# Patient Record
Sex: FEMALE | Race: WHITE | NOT HISPANIC OR LATINO | Employment: PART TIME | URBAN - NONMETROPOLITAN AREA
[De-identification: names, ages, dates, MRNs, and addresses within clinical notes are randomized per-mention and may not be internally consistent; named-entity substitution may affect disease eponyms.]

---

## 2020-11-06 ENCOUNTER — OFFICE VISIT (OUTPATIENT)
Dept: FAMILY MEDICINE CLINIC | Facility: CLINIC | Age: 55
End: 2020-11-06

## 2020-11-06 VITALS
TEMPERATURE: 98.2 F | BODY MASS INDEX: 26.84 KG/M2 | WEIGHT: 171 LBS | HEIGHT: 67 IN | OXYGEN SATURATION: 98 % | SYSTOLIC BLOOD PRESSURE: 108 MMHG | DIASTOLIC BLOOD PRESSURE: 73 MMHG | HEART RATE: 71 BPM | RESPIRATION RATE: 16 BRPM

## 2020-11-06 DIAGNOSIS — R53.83 FATIGUE, UNSPECIFIED TYPE: ICD-10-CM

## 2020-11-06 DIAGNOSIS — R52 PAIN: ICD-10-CM

## 2020-11-06 DIAGNOSIS — E53.8 VITAMIN B 12 DEFICIENCY: Primary | ICD-10-CM

## 2020-11-06 DIAGNOSIS — L65.9 HAIR LOSS: ICD-10-CM

## 2020-11-06 DIAGNOSIS — E55.9 VITAMIN D DEFICIENCY: ICD-10-CM

## 2020-11-06 DIAGNOSIS — E78.2 MIXED HYPERLIPIDEMIA: ICD-10-CM

## 2020-11-06 DIAGNOSIS — Z13.1 SCREENING FOR DIABETES MELLITUS: ICD-10-CM

## 2020-11-06 PROBLEM — M47.26 OSTEOARTHRITIS OF SPINE WITH RADICULOPATHY, LUMBAR REGION: Status: ACTIVE | Noted: 2020-11-06

## 2020-11-06 PROBLEM — Z85.3 HISTORY OF BREAST CANCER: Status: ACTIVE | Noted: 2020-11-06

## 2020-11-06 LAB — HBA1C MFR BLD: 5.9 % (ref 3.5–5.6)

## 2020-11-06 PROCEDURE — 86431 RHEUMATOID FACTOR QUANT: CPT | Performed by: NURSE PRACTITIONER

## 2020-11-06 PROCEDURE — 86140 C-REACTIVE PROTEIN: CPT | Performed by: NURSE PRACTITIONER

## 2020-11-06 PROCEDURE — 83036 HEMOGLOBIN GLYCOSYLATED A1C: CPT | Performed by: NURSE PRACTITIONER

## 2020-11-06 PROCEDURE — 86038 ANTINUCLEAR ANTIBODIES: CPT | Performed by: NURSE PRACTITIONER

## 2020-11-06 PROCEDURE — 99203 OFFICE O/P NEW LOW 30 MIN: CPT | Performed by: NURSE PRACTITIONER

## 2020-11-06 PROCEDURE — 36415 COLL VENOUS BLD VENIPUNCTURE: CPT | Performed by: NURSE PRACTITIONER

## 2020-11-06 PROCEDURE — 84439 ASSAY OF FREE THYROXINE: CPT | Performed by: NURSE PRACTITIONER

## 2020-11-06 PROCEDURE — 84550 ASSAY OF BLOOD/URIC ACID: CPT | Performed by: NURSE PRACTITIONER

## 2020-11-06 PROCEDURE — 82607 VITAMIN B-12: CPT | Performed by: NURSE PRACTITIONER

## 2020-11-06 PROCEDURE — 82306 VITAMIN D 25 HYDROXY: CPT | Performed by: NURSE PRACTITIONER

## 2020-11-06 PROCEDURE — 84481 FREE ASSAY (FT-3): CPT | Performed by: NURSE PRACTITIONER

## 2020-11-06 PROCEDURE — 84443 ASSAY THYROID STIM HORMONE: CPT | Performed by: NURSE PRACTITIONER

## 2020-11-06 PROCEDURE — 80061 LIPID PANEL: CPT | Performed by: NURSE PRACTITIONER

## 2020-11-06 PROCEDURE — 80053 COMPREHEN METABOLIC PANEL: CPT | Performed by: NURSE PRACTITIONER

## 2020-11-06 PROCEDURE — 85025 COMPLETE CBC W/AUTO DIFF WBC: CPT | Performed by: NURSE PRACTITIONER

## 2020-11-06 RX ORDER — DULOXETIN HYDROCHLORIDE 30 MG/1
30 CAPSULE, DELAYED RELEASE ORAL DAILY
Qty: 30 CAPSULE | Refills: 0 | Status: SHIPPED | OUTPATIENT
Start: 2020-11-06 | End: 2021-06-04 | Stop reason: SDUPTHER

## 2020-11-06 RX ORDER — DULOXETIN HYDROCHLORIDE 30 MG/1
30 CAPSULE, DELAYED RELEASE ORAL DAILY
COMMUNITY
End: 2020-11-06 | Stop reason: SDUPTHER

## 2020-11-06 NOTE — PROGRESS NOTES
Chief Complaint   Patient presents with   • Osteoarthritis     Wants to discuss Duloxetine   • Insomnia     Has had insomnia for a while, and has been experiencing thinning hair and more body warmth lately.        Subjective   Deena Deluca is a 55 y.o.  female who presents today for   Pt here as a new patient was started on Duloxentine due to arthritis in  Hands has tried to wean off of medications but she was unable too due to an anxiety S/E would like to wean of these medications now    Reports that she has been having hair thinning and insomnia for some time now      Deena Deluca  has a past medical history of Arthritis, Breast cancer (CMS/HCC), and Hyperlipidemia.    No Known Allergies    Current Outpatient Medications:   •  DULoxetine (CYMBALTA) 30 MG capsule, Take 1 capsule by mouth Daily., Disp: 30 capsule, Rfl: 0  Past Medical History:   Diagnosis Date   • Arthritis    • Breast cancer (CMS/HCC)    • Hyperlipidemia      Past Surgical History:   Procedure Laterality Date   • APPENDECTOMY     • BREAST LUMPECTOMY Left     Diagnosed in ; Treatment continued until      Social History     Socioeconomic History   • Marital status:      Spouse name: Not on file   • Number of children: Not on file   • Years of education: Not on file   • Highest education level: Not on file   Tobacco Use   • Smoking status: Former Smoker     Packs/day: 1.00     Years: 13.00     Pack years: 13.00     Types: Cigarettes     Start date:      Quit date:      Years since quittin.8   • Smokeless tobacco: Never Used   Substance and Sexual Activity   • Alcohol use: Not Currently   • Drug use: Not Currently     Comment: history of methamphetamine- Sober since    • Sexual activity: Not Currently     Family History   Problem Relation Age of Onset   • Hyperthyroidism Mother    • Retinitis pigmentosa Mother    • Arthritis Mother    • No Known Problems Father    • Diabetes type I Son      PHQ-2  "Depression Screening  Little interest or pleasure in doing things? 0   Feeling down, depressed, or hopeless? 0   PHQ-2 Total Score 0     PHQ-9 Depression Screening  Little interest or pleasure in doing things? 0   Feeling down, depressed, or hopeless? 0   Trouble falling or staying asleep, or sleeping too much?     Feeling tired or having little energy?     Poor appetite or overeating?     Feeling bad about yourself - or that you are a failure or have let yourself or your family down?     Trouble concentrating on things, such as reading the newspaper or watching television?     Moving or speaking so slowly that other people could have noticed? Or the opposite - being so fidgety or restless that you have been moving around a lot more than usual?     Thoughts that you would be better off dead, or of hurting yourself in some way?     PHQ-9 Total Score 0   If you checked off any problems, how difficult have these problems made it for you to do your work, take care of things at home, or get along with other people?         Family history, surgical history, past medical history, Allergies and med's reviewed with patient today and updated in [employer] EMR.     ROS:  Review of Systems   Musculoskeletal: Positive for arthralgias.   All other systems reviewed and are negative.      OBJECTIVE:  Vitals:    11/06/20 1059   BP: 108/73   BP Location: Right arm   Patient Position: Sitting   Cuff Size: Adult   Pulse: 71   Resp: 16   Temp: 98.2 °F (36.8 °C)   TempSrc: Temporal   SpO2: 98%   Weight: 77.6 kg (171 lb)   Height: 170.8 cm (67.25\")     Body mass index is 26.58 kg/m².  Physical Exam  Vitals signs and nursing note reviewed.   Constitutional:       Appearance: She is well-developed.   HENT:      Head: Normocephalic and atraumatic.   Eyes:      Conjunctiva/sclera: Conjunctivae normal.      Pupils: Pupils are equal, round, and reactive to light.   Neck:      Musculoskeletal: Normal range of motion and neck supple.   Cardiovascular: "      Rate and Rhythm: Normal rate and regular rhythm.      Heart sounds: Normal heart sounds.   Pulmonary:      Effort: Pulmonary effort is normal.      Breath sounds: Normal breath sounds.   Abdominal:      General: Bowel sounds are normal.      Palpations: Abdomen is soft.   Musculoskeletal: Normal range of motion.   Skin:     General: Skin is warm and dry.   Neurological:      Mental Status: She is alert and oriented to person, place, and time.   Psychiatric:         Behavior: Behavior normal.         Thought Content: Thought content normal.         Judgment: Judgment normal.         ASSESSMENT/ PLAN:    Diagnoses and all orders for this visit:    1. Vitamin B 12 deficiency (Primary)  -     Vitamin B12; Future  -     Vitamin B12    2. Vitamin D deficiency  -     Vitamin D 25 Hydroxy; Future  -     Vitamin D 25 Hydroxy    3. Mixed hyperlipidemia  -     Lipid Panel; Future  -     Lipid Panel    4. Hair loss  -     TSH; Future  -     T4, Free; Future  -     CBC & Differential; Future  -     T3, free; Future  -     TSH  -     T4, Free  -     CBC & Differential  -     T3, free  -     CBC Auto Differential    5. Fatigue, unspecified type  -     Comprehensive Metabolic Panel; Future  -     CBC & Differential; Future  -     Uric acid; Future  -     Rheumatoid factor; Future  -     C-reactive protein; Future  -     ANDERSON; Future  -     Comprehensive Metabolic Panel  -     CBC & Differential  -     Uric acid  -     Rheumatoid factor  -     C-reactive protein  -     ANDERSON  -     CBC Auto Differential    6. Screening for diabetes mellitus  -     Hemoglobin A1c; Future  -     Hemoglobin A1c    7. Pain  -     DULoxetine (CYMBALTA) 30 MG capsule; Take 1 capsule by mouth Daily.  Dispense: 30 capsule; Refill: 0  -     Uric acid; Future  -     Rheumatoid factor; Future  -     C-reactive protein; Future  -     ANDERSON; Future  -     Uric acid  -     Rheumatoid factor  -     C-reactive protein  -     ANDERSON        Orders Placed Today:     New  Medications Ordered This Visit   Medications   • DULoxetine (CYMBALTA) 30 MG capsule     Sig: Take 1 capsule by mouth Daily.     Dispense:  30 capsule     Refill:  0        Management Plan:   Labs today  Start the weaning process slowing of cymbalta instructions given on this     An After Visit Summary was printed and given to the patient at discharge.    Follow-up: Return in about 2 weeks (around 11/20/2020).    YESICA Betancourt 11/17/2020 11:25 EST  This note was electronically signed.

## 2020-11-07 LAB
25(OH)D3 SERPL-MCNC: 31.1 NG/ML (ref 30–100)
ALBUMIN SERPL-MCNC: 4.5 G/DL (ref 3.5–5.2)
ALBUMIN/GLOB SERPL: 1.8 G/DL
ALP SERPL-CCNC: 72 U/L (ref 39–117)
ALT SERPL W P-5'-P-CCNC: 16 U/L (ref 1–33)
ANION GAP SERPL CALCULATED.3IONS-SCNC: 6.4 MMOL/L (ref 5–15)
AST SERPL-CCNC: 20 U/L (ref 1–32)
BASOPHILS # BLD AUTO: 0.07 10*3/MM3 (ref 0–0.2)
BASOPHILS NFR BLD AUTO: 1.4 % (ref 0–1.5)
BILIRUB SERPL-MCNC: 0.4 MG/DL (ref 0–1.2)
BUN SERPL-MCNC: 16 MG/DL (ref 6–20)
BUN/CREAT SERPL: 22.9 (ref 7–25)
CALCIUM SPEC-SCNC: 9.7 MG/DL (ref 8.6–10.5)
CHLORIDE SERPL-SCNC: 104 MMOL/L (ref 98–107)
CHOLEST SERPL-MCNC: 212 MG/DL (ref 0–200)
CHROMATIN AB SERPL-ACNC: <10 IU/ML (ref 0–14)
CO2 SERPL-SCNC: 30.6 MMOL/L (ref 22–29)
CREAT SERPL-MCNC: 0.7 MG/DL (ref 0.57–1)
CRP SERPL-MCNC: 0.08 MG/DL (ref 0–0.5)
DEPRECATED RDW RBC AUTO: 38.7 FL (ref 37–54)
EOSINOPHIL # BLD AUTO: 0.22 10*3/MM3 (ref 0–0.4)
EOSINOPHIL NFR BLD AUTO: 4.3 % (ref 0.3–6.2)
ERYTHROCYTE [DISTWIDTH] IN BLOOD BY AUTOMATED COUNT: 12.2 % (ref 12.3–15.4)
GFR SERPL CREATININE-BSD FRML MDRD: 86 ML/MIN/1.73
GLOBULIN UR ELPH-MCNC: 2.5 GM/DL
GLUCOSE SERPL-MCNC: 86 MG/DL (ref 65–99)
HCT VFR BLD AUTO: 37.4 % (ref 34–46.6)
HDLC SERPL-MCNC: 54 MG/DL (ref 40–60)
HGB BLD-MCNC: 12.3 G/DL (ref 12–15.9)
IMM GRANULOCYTES # BLD AUTO: 0.01 10*3/MM3 (ref 0–0.05)
IMM GRANULOCYTES NFR BLD AUTO: 0.2 % (ref 0–0.5)
LDLC SERPL CALC-MCNC: 135 MG/DL (ref 0–100)
LDLC/HDLC SERPL: 2.46 {RATIO}
LYMPHOCYTES # BLD AUTO: 1.52 10*3/MM3 (ref 0.7–3.1)
LYMPHOCYTES NFR BLD AUTO: 29.9 % (ref 19.6–45.3)
MCH RBC QN AUTO: 29.2 PG (ref 26.6–33)
MCHC RBC AUTO-ENTMCNC: 32.9 G/DL (ref 31.5–35.7)
MCV RBC AUTO: 88.8 FL (ref 79–97)
MONOCYTES # BLD AUTO: 0.39 10*3/MM3 (ref 0.1–0.9)
MONOCYTES NFR BLD AUTO: 7.7 % (ref 5–12)
NEUTROPHILS NFR BLD AUTO: 2.88 10*3/MM3 (ref 1.7–7)
NEUTROPHILS NFR BLD AUTO: 56.5 % (ref 42.7–76)
NRBC BLD AUTO-RTO: 0 /100 WBC (ref 0–0.2)
PLATELET # BLD AUTO: 248 10*3/MM3 (ref 140–450)
PMV BLD AUTO: 10 FL (ref 6–12)
POTASSIUM SERPL-SCNC: 4.3 MMOL/L (ref 3.5–5.2)
PROT SERPL-MCNC: 7 G/DL (ref 6–8.5)
RBC # BLD AUTO: 4.21 10*6/MM3 (ref 3.77–5.28)
SODIUM SERPL-SCNC: 141 MMOL/L (ref 136–145)
T3FREE SERPL-MCNC: 2.96 PG/ML (ref 2–4.4)
T4 FREE SERPL-MCNC: 1 NG/DL (ref 0.93–1.7)
TRIGL SERPL-MCNC: 127 MG/DL (ref 0–150)
TSH SERPL DL<=0.05 MIU/L-ACNC: 3.74 UIU/ML (ref 0.27–4.2)
URATE SERPL-MCNC: 4.2 MG/DL (ref 2.4–5.7)
VIT B12 BLD-MCNC: 677 PG/ML (ref 211–946)
VLDLC SERPL-MCNC: 23 MG/DL (ref 5–40)
WBC # BLD AUTO: 5.09 10*3/MM3 (ref 3.4–10.8)

## 2020-11-09 ENCOUNTER — TELEPHONE (OUTPATIENT)
Dept: FAMILY MEDICINE CLINIC | Facility: CLINIC | Age: 55
End: 2020-11-09

## 2020-11-09 DIAGNOSIS — R52 PAIN: ICD-10-CM

## 2020-11-09 LAB — ANA SER QL: NEGATIVE

## 2020-11-09 RX ORDER — DULOXETIN HYDROCHLORIDE 30 MG/1
30 CAPSULE, DELAYED RELEASE ORAL DAILY
Qty: 30 CAPSULE | Refills: 0 | Status: CANCELLED | OUTPATIENT
Start: 2020-11-09

## 2020-11-09 NOTE — TELEPHONE ENCOUNTER
CALLED PATIENT. PATIENT'S IDENTITY VERIFIED. ADVISED PT OF PROVIDER'S RESPONSE RE: LAB RESULTS. SHE VOICED UNDERSTANDING. NO FURTHER CONCERNS VOICED.

## 2020-11-20 ENCOUNTER — TELEPHONE (OUTPATIENT)
Dept: FAMILY MEDICINE CLINIC | Facility: CLINIC | Age: 55
End: 2020-11-20

## 2020-11-20 NOTE — TELEPHONE ENCOUNTER
----- Message from YESICA Kam sent at 11/17/2020  9:09 AM EST -----  1. Elevated Cholesterol has she ever taken anything for this? Elevated LDL as well. Would like to start something for elevated cholesterol. If she does not want to do this we can try diet control for 3 months   2. HGBa1c is elevated up to 5.9 shows an increased risk for diabetes. Decrease carbs and sugars in diet repeat in 3 months

## 2021-03-03 ENCOUNTER — OFFICE VISIT (OUTPATIENT)
Dept: FAMILY MEDICINE CLINIC | Facility: CLINIC | Age: 56
End: 2021-03-03

## 2021-03-03 VITALS
TEMPERATURE: 96.6 F | WEIGHT: 168 LBS | SYSTOLIC BLOOD PRESSURE: 126 MMHG | HEIGHT: 67 IN | HEART RATE: 87 BPM | BODY MASS INDEX: 26.37 KG/M2 | OXYGEN SATURATION: 95 % | DIASTOLIC BLOOD PRESSURE: 89 MMHG

## 2021-03-03 DIAGNOSIS — M54.9 CHRONIC BACK PAIN GREATER THAN 3 MONTHS DURATION: ICD-10-CM

## 2021-03-03 DIAGNOSIS — N32.81 OVERACTIVE BLADDER: ICD-10-CM

## 2021-03-03 DIAGNOSIS — G89.29 CHRONIC BACK PAIN GREATER THAN 3 MONTHS DURATION: ICD-10-CM

## 2021-03-03 DIAGNOSIS — R31.9 HEMATURIA, UNSPECIFIED TYPE: ICD-10-CM

## 2021-03-03 DIAGNOSIS — M50.30 DDD (DEGENERATIVE DISC DISEASE), CERVICAL: ICD-10-CM

## 2021-03-03 DIAGNOSIS — M51.36 DDD (DEGENERATIVE DISC DISEASE), LUMBAR: ICD-10-CM

## 2021-03-03 DIAGNOSIS — N32.81 URGENCY-FREQUENCY SYNDROME: ICD-10-CM

## 2021-03-03 DIAGNOSIS — R35.0 URINARY FREQUENCY: Primary | ICD-10-CM

## 2021-03-03 LAB
BILIRUB BLD-MCNC: NEGATIVE MG/DL
CLARITY, POC: CLEAR
COLOR UR: YELLOW
GLUCOSE UR STRIP-MCNC: NEGATIVE MG/DL
KETONES UR QL: NEGATIVE
LEUKOCYTE EST, POC: NEGATIVE
NITRITE UR-MCNC: NEGATIVE MG/ML
PH UR: 5.5 [PH] (ref 5–8)
PROT UR STRIP-MCNC: NEGATIVE MG/DL
RBC # UR STRIP: ABNORMAL /UL
SP GR UR: 1.02 (ref 1–1.03)
UROBILINOGEN UR QL: NORMAL

## 2021-03-03 PROCEDURE — 81003 URINALYSIS AUTO W/O SCOPE: CPT | Performed by: FAMILY MEDICINE

## 2021-03-03 PROCEDURE — 87086 URINE CULTURE/COLONY COUNT: CPT | Performed by: FAMILY MEDICINE

## 2021-03-03 PROCEDURE — 99214 OFFICE O/P EST MOD 30 MIN: CPT | Performed by: FAMILY MEDICINE

## 2021-03-03 RX ORDER — CYCLOBENZAPRINE HCL 10 MG
10 TABLET ORAL 3 TIMES DAILY PRN
Qty: 45 TABLET | Refills: 0 | Status: SHIPPED | OUTPATIENT
Start: 2021-03-03 | End: 2021-04-06

## 2021-03-03 RX ORDER — SULFAMETHOXAZOLE AND TRIMETHOPRIM 800; 160 MG/1; MG/1
1 TABLET ORAL 2 TIMES DAILY
Qty: 6 TABLET | Refills: 0 | Status: SHIPPED | OUTPATIENT
Start: 2021-03-03 | End: 2021-06-04

## 2021-03-03 NOTE — PROGRESS NOTES
Subjective   Deena Deluca is a 55 y.o. female.     Chief Complaint   Patient presents with   • Urinary Frequency   • Back Pain       History of Present Illness   Pt reorts urgency frequency in the past year, worsened in past 3-4 months  Pt has hx blood in urine   had CT stone protocol done at [facility], stones  ( non 0bsructive) DDD noted   She was given muscle relaxant flexeril and diclofenac tablets)  Hx leg length discrepancy  Hx arthrits years oag, diagnosed years ago, in cervcal spine, lumbar, treated wwith duloxetine  Pt tried to taper off duloxetine but had side effects    The following portions of the patient's history were reviewed and updated as appropriate: allergies, current medications, past family history, past medical history, past social history, past surgical history and problem list.    Past Medical History:   Diagnosis Date   • Arthritis    • Breast cancer (CMS/HCC)    • Hyperlipidemia        Past Surgical History:   Procedure Laterality Date   • APPENDECTOMY     • BREAST LUMPECTOMY Left 2011    Diagnosed in 2011; Treatment continued until 2012       Family History   Problem Relation Age of Onset   • Hyperthyroidism Mother    • Retinitis pigmentosa Mother    • Arthritis Mother    • No Known Problems Father    • Diabetes type I Son        Review of Systems   Constitutional: Negative for fatigue, unexpected weight gain and unexpected weight loss.   HENT: Negative for congestion, sinus pressure and sore throat.         Normal smell/taste   Eyes: Negative for blurred vision and visual disturbance.   Respiratory: Negative for cough, shortness of breath and wheezing.    Cardiovascular: Negative for chest pain, palpitations and leg swelling.   Gastrointestinal: Negative for abdominal pain, constipation, diarrhea, nausea, vomiting, GERD and indigestion.   Musculoskeletal: Negative for arthralgias, back pain, gait problem, joint swelling and neck pain.   Skin: Negative for rash, skin lesions and bruise.    Allergic/Immunologic: Negative for environmental allergies.   Neurological: Negative for dizziness, tremors, syncope, weakness, light-headedness, headache and memory problem.   Psychiatric/Behavioral: Negative for sleep disturbance, depressed mood and stress. The patient is not nervous/anxious.      Objective   Physical Exam  Vitals and nursing note reviewed.   Constitutional:       General: She is not in acute distress.     Appearance: She is well-developed. She is not diaphoretic.   HENT:      Head: Normocephalic and atraumatic.      Right Ear: External ear normal.      Left Ear: External ear normal.      Nose: Nose normal.   Eyes:      Conjunctiva/sclera: Conjunctivae normal.      Pupils: Pupils are equal, round, and reactive to light.   Neck:      Thyroid: No thyromegaly.   Cardiovascular:      Rate and Rhythm: Normal rate and regular rhythm.      Heart sounds: Normal heart sounds. No murmur heard.   No friction rub. No gallop.    Pulmonary:      Effort: Pulmonary effort is normal.      Breath sounds: Normal breath sounds. No wheezing.   Abdominal:      General: Bowel sounds are normal.      Palpations: Abdomen is soft.      Tenderness: There is no abdominal tenderness.   Musculoskeletal:         General: No tenderness or deformity. Normal range of motion.      Cervical back: Normal range of motion and neck supple.   Lymphadenopathy:      Cervical: No cervical adenopathy.   Skin:     General: Skin is warm and dry.      Capillary Refill: Capillary refill takes less than 2 seconds.      Findings: No rash.   Neurological:      Mental Status: She is alert and oriented to person, place, and time.      Cranial Nerves: No cranial nerve deficit.   Psychiatric:         Behavior: Behavior normal.         Thought Content: Thought content normal.         Judgment: Judgment normal.         Vitals:    03/03/21 1415   BP: 126/89   Pulse: 87   Temp: 96.6 °F (35.9 °C)   SpO2: 95%     Body mass index is 26.31  kg/m².    Hemoglobin A1C   Date Value Ref Range Status   11/06/2020 5.9 (H) 3.5 - 5.6 % Final     LDL Cholesterol    Date Value Ref Range Status   11/06/2020 135 (H) 0 - 100 mg/dL Final     TSH   Date Value Ref Range Status   11/06/2020 3.740 0.270 - 4.200 uIU/mL Final     Results for orders placed or performed in visit on 03/03/21   POCT urinalysis dipstick, automated    Specimen: Urine   Result Value Ref Range    Color Yellow Yellow, Straw, Dark Yellow, Sarah    Clarity, UA Clear Clear    Specific Gravity  1.020 1.005 - 1.030    pH, Urine 5.5 5.0 - 8.0    Leukocytes Negative Negative    Nitrite, UA Negative Negative    Protein, POC Negative Negative mg/dL    Glucose, UA Negative Negative, 1000 mg/dL (3+) mg/dL    Ketones, UA Negative Negative    Urobilinogen, UA Normal Normal    Bilirubin Negative Negative    Blood, UA 1+ (A) Negative   Results for orders placed or performed in visit on 11/06/20   CBC Auto Differential    Specimen: Arm, Right; Blood   Result Value Ref Range    WBC 5.09 3.40 - 10.80 10*3/mm3    RBC 4.21 3.77 - 5.28 10*6/mm3    Hemoglobin 12.3 12.0 - 15.9 g/dL    Hematocrit 37.4 34.0 - 46.6 %    MCV 88.8 79.0 - 97.0 fL    MCH 29.2 26.6 - 33.0 pg    MCHC 32.9 31.5 - 35.7 g/dL    RDW 12.2 (L) 12.3 - 15.4 %    RDW-SD 38.7 37.0 - 54.0 fl    MPV 10.0 6.0 - 12.0 fL    Platelets 248 140 - 450 10*3/mm3    Neutrophil % 56.5 42.7 - 76.0 %    Lymphocyte % 29.9 19.6 - 45.3 %    Monocyte % 7.7 5.0 - 12.0 %    Eosinophil % 4.3 0.3 - 6.2 %    Basophil % 1.4 0.0 - 1.5 %    Immature Grans % 0.2 0.0 - 0.5 %    Neutrophils, Absolute 2.88 1.70 - 7.00 10*3/mm3    Lymphocytes, Absolute 1.52 0.70 - 3.10 10*3/mm3    Monocytes, Absolute 0.39 0.10 - 0.90 10*3/mm3    Eosinophils, Absolute 0.22 0.00 - 0.40 10*3/mm3    Basophils, Absolute 0.07 0.00 - 0.20 10*3/mm3    Immature Grans, Absolute 0.01 0.00 - 0.05 10*3/mm3    nRBC 0.0 0.0 - 0.2 /100 WBC   Vitamin D 25 Hydroxy    Specimen: Arm, Right; Blood   Result Value Ref Range     25 Hydroxy, Vitamin D 31.1 30.0 - 100.0 ng/ml   Rheumatoid factor    Specimen: Arm, Left; Blood   Result Value Ref Range    Rheumatoid Factor Quantitative <10.0 0.0 - 14.0 IU/mL   C-reactive protein    Specimen: Arm, Left; Blood   Result Value Ref Range    C-Reactive Protein 0.08 0.00 - 0.50 mg/dL   ANDERSON    Specimen: Arm, Right; Blood   Result Value Ref Range    Antinuclear Antibodies (ANDERSON) Negative Negative   Uric acid    Specimen: Arm, Left; Blood   Result Value Ref Range    Uric Acid 4.2 2.4 - 5.7 mg/dL   T3, free    Specimen: Arm, Left; Blood   Result Value Ref Range    T3, Free 2.96 2.00 - 4.40 pg/mL   TSH    Specimen: Arm, Left; Blood   Result Value Ref Range    TSH 3.740 0.270 - 4.200 uIU/mL   T4, Free    Specimen: Arm, Left; Blood   Result Value Ref Range    Free T4 1.00 0.93 - 1.70 ng/dL   Hemoglobin A1c    Specimen: Arm, Right; Blood   Result Value Ref Range    Hemoglobin A1C 5.9 (H) 3.5 - 5.6 %   Vitamin B12    Specimen: Arm, Right; Blood   Result Value Ref Range    Vitamin B-12 677 211 - 946 pg/mL   Lipid Panel    Specimen: Arm, Left; Blood   Result Value Ref Range    Total Cholesterol 212 (H) 0 - 200 mg/dL    Triglycerides 127 0 - 150 mg/dL    HDL Cholesterol 54 40 - 60 mg/dL    LDL Cholesterol  135 (H) 0 - 100 mg/dL    VLDL Cholesterol 23 5 - 40 mg/dL    LDL/HDL Ratio 2.46    Comprehensive Metabolic Panel    Specimen: Arm, Left; Blood   Result Value Ref Range    Glucose 86 65 - 99 mg/dL    BUN 16 6 - 20 mg/dL    Creatinine 0.70 0.57 - 1.00 mg/dL    Sodium 141 136 - 145 mmol/L    Potassium 4.3 3.5 - 5.2 mmol/L    Chloride 104 98 - 107 mmol/L    CO2 30.6 (H) 22.0 - 29.0 mmol/L    Calcium 9.7 8.6 - 10.5 mg/dL    Total Protein 7.0 6.0 - 8.5 g/dL    Albumin 4.50 3.50 - 5.20 g/dL    ALT (SGPT) 16 1 - 33 U/L    AST (SGOT) 20 1 - 32 U/L    Alkaline Phosphatase 72 39 - 117 U/L    Total Bilirubin 0.4 0.0 - 1.2 mg/dL    eGFR Non African Amer 86 >60 mL/min/1.73    Globulin 2.5 gm/dL    A/G Ratio 1.8 g/dL     BUN/Creatinine Ratio 22.9 7.0 - 25.0    Anion Gap 6.4 5.0 - 15.0 mmol/L       Assessment/Plan   Diagnoses and all orders for this visit:    1. Urinary frequency (Primary)  -     POCT urinalysis dipstick, automated  -     Ambulatory Referral to Spine Surgery    2. Urgency-frequency syndrome    3. DDD (degenerative disc disease), lumbar    4. DDD (degenerative disc disease), cervical    refer to spine surgery  rx bactrim

## 2021-03-04 ENCOUNTER — TELEPHONE (OUTPATIENT)
Dept: FAMILY MEDICINE CLINIC | Facility: CLINIC | Age: 56
End: 2021-03-04

## 2021-03-04 LAB — BACTERIA SPEC AEROBE CULT: NO GROWTH

## 2021-03-04 NOTE — TELEPHONE ENCOUNTER
Caller: Deena Deluca    Relationship: Self    Best call back number:[phone]     Caller requesting test results    What test was performed: Urine Test    When was the test performed: 03/03/2021    Where was the test performed: IN OFFICE    Additional notes: PATIENT IS CONFUSED ON WHY THE DOCTOR SENT MEDICATION TO THE PHARMACY WHEN SINCE SHE HAS BEEN WAITING ON THE RESULT OF THE TEST TO COME BACK. PLEASE CONTACT PATIENT AND ADVISE ON THIS MATTER.

## 2021-03-04 NOTE — TELEPHONE ENCOUNTER
CALLED AND S/W PATIENT. ADVISED HER THAT MD TYPICALLY SENDS OVER AN ANTIBIOTIC TO GO AHEAD TO START ON SO THAT THE UTI CAN START GETTING TREATED UNTIL THE URINE CULTURE COMES BACK AND SAYS OTHERWISE. PATIENT VOICED UNDERSTANDING.

## 2021-03-09 ENCOUNTER — TELEPHONE (OUTPATIENT)
Dept: FAMILY MEDICINE CLINIC | Facility: CLINIC | Age: 56
End: 2021-03-09

## 2021-03-09 NOTE — TELEPHONE ENCOUNTER
PATIENT CALLED REQUESTING TO GO OVER UA RESULTS AS WELL AS CHECK THE UPDATE OF A SPINE SPECIALIST REFERRAL.    PLEASE ADVISE  [phone]

## 2021-03-09 NOTE — TELEPHONE ENCOUNTER
Dr. Morrissey,   Please advise. Patient's last appt was with you. Spine referral has been sent, but UA still needs looked at. Thank you.

## 2021-03-09 NOTE — TELEPHONE ENCOUNTER
Called and s/w patient. Advised her of ua/culture answers, as well as spine referral. She voiced understanding, and is aware that Spine will be calling her to schedule.

## 2021-04-06 ENCOUNTER — OFFICE VISIT (OUTPATIENT)
Dept: NEUROSURGERY | Facility: CLINIC | Age: 56
End: 2021-04-06

## 2021-04-06 VITALS
HEART RATE: 72 BPM | HEIGHT: 69 IN | WEIGHT: 169 LBS | SYSTOLIC BLOOD PRESSURE: 120 MMHG | DIASTOLIC BLOOD PRESSURE: 79 MMHG | BODY MASS INDEX: 25.03 KG/M2

## 2021-04-06 DIAGNOSIS — M54.50 CHRONIC BILATERAL LOW BACK PAIN WITHOUT SCIATICA: Primary | ICD-10-CM

## 2021-04-06 DIAGNOSIS — M53.3 SACROILIAC DYSFUNCTION: ICD-10-CM

## 2021-04-06 DIAGNOSIS — G89.29 CHRONIC BILATERAL LOW BACK PAIN WITHOUT SCIATICA: Primary | ICD-10-CM

## 2021-04-06 DIAGNOSIS — M51.36 DDD (DEGENERATIVE DISC DISEASE), LUMBAR: ICD-10-CM

## 2021-04-06 PROCEDURE — 99204 OFFICE O/P NEW MOD 45 MIN: CPT | Performed by: NURSE PRACTITIONER

## 2021-04-06 RX ORDER — CYCLOBENZAPRINE HCL 10 MG
5 TABLET ORAL NIGHTLY PRN
Qty: 30 TABLET | Refills: 0 | Status: SHIPPED | OUTPATIENT
Start: 2021-04-06 | End: 2021-06-04

## 2021-04-06 NOTE — PROGRESS NOTES
"Subjective   History of Present Illness: Deena Deluca is a 55 y.o. female being seen to the clinic as a new patient for low back pain.  She was referred by Dr. Morrissey.  Patient has had chronic back pain for years but experienced an exacerbation in December where nothing she was doing was helping.  Patient stated that she gets back spasming and her\" back was out\" at unpredictable times where she cannot move.  She recently moved from [place] where she had undergone some physical therapy for her chronic back pain.  She was told she has arthritis in her back as well as one leg shorter than the other.  Patient complains more today of low back pain with right-sided sciatica.  She states that this back pain radiates into her hip and buttock on the right side and posteriorly into her right thigh  She states that this pain is mostly burning and tingling but no numbness.  She has also noticed that she has had some muscle atrophy in her right calf as well.  Patient states that standing too much, walking or sitting too much does exacerbate her pain.  She does do some physical therapy exercises and stretches that helps alleviate some of her pain.  Patient denies any bowel/bladder dysfunction, numbness, weakness or fall.    Back Pain  This is a chronic problem. The current episode started more than 1 year ago. The problem occurs daily. The problem has been gradually worsening since onset. The pain is present in the lumbar spine, gluteal and sacro-iliac. The quality of the pain is described as aching and burning. The pain radiates to the right thigh. The pain is moderate. The symptoms are aggravated by standing and sitting. Associated symptoms include leg pain. Pertinent negatives include no abdominal pain, bladder incontinence, bowel incontinence, chest pain, fever, numbness, pelvic pain or weakness. She has tried home exercises and NSAIDs for the symptoms. The treatment provided mild relief.       The following portions of the " "patient's history were reviewed and updated as appropriate: allergies, current medications, past family history, past medical history, past social history, past surgical history and problem list.    Review of Systems   Constitutional: Negative for chills, fatigue and fever.   HENT: Negative for postnasal drip, sinus pressure and sinus pain.    Eyes: Negative for photophobia, pain and visual disturbance.   Respiratory: Negative for cough, chest tightness, shortness of breath and wheezing.    Cardiovascular: Negative for chest pain and palpitations.   Gastrointestinal: Negative for abdominal pain, bowel incontinence, constipation, diarrhea, nausea and vomiting.   Genitourinary: Negative for bladder incontinence, difficulty urinating, flank pain and pelvic pain.   Musculoskeletal: Positive for back pain. Negative for gait problem.   Skin: Negative for rash and wound.   Neurological: Negative for seizures, syncope, speech difficulty, weakness and numbness.   Psychiatric/Behavioral: Negative for decreased concentration and hallucinations.   All other systems reviewed and are negative.      Objective     ./79 (BP Location: Left arm, Patient Position: Sitting, Cuff Size: Adult)   Pulse 72   Ht 175.3 cm (69\")   Wt 76.7 kg (169 lb)   BMI 24.96 kg/m²    Body mass index is 24.96 kg/m².      Physical Exam  Vitals reviewed.   Eyes:      Pupils: Pupils are equal, round, and reactive to light.   Pulmonary:      Effort: Pulmonary effort is normal.   Neurological:      Mental Status: She is oriented to person, place, and time.      Gait: Gait is intact.      Deep Tendon Reflexes: Strength normal.      Reflex Scores:       Patellar reflexes are 0 on the right side and 1+ on the left side.  Psychiatric:         Speech: Speech normal.       Neurologic Exam     Mental Status   Oriented to person, place, and time.   Speech: speech is normal   Level of consciousness: alert    Cranial Nerves     CN III, IV, VI   Pupils are equal, " round, and reactive to light.    Motor Exam     Strength   Strength 5/5 throughout.     Sensory Exam   Light touch normal.     Gait, Coordination, and Reflexes     Gait  Gait: normal    Reflexes   Reflexes 2+ except as noted.   Right patellar: 0  Left patellar: 1+    Positive SI compression test  Positive Bossman and Shira's bilaterally with right worse than left  Positive right straight leg raise test 40 degrees  Positive facet loading on extension    Assessment/Plan   Independent Review of Radiographic Studies:      I personally reviewed the images from the following studies.    CT abdomen and pelvis 12/29/2020    Multilevel degenerative changes most noted at L1-L2 L3-L4 and L5-S1.  Patient has severe disc height loss and L5-S1 with Modic changes on superior endplate of S1.  L3-L4 demonstrates mild loss of height with central disc bulges producing moderate canal stenosis and moderate to severe foraminal narrowing.  L1-L2 demonstrate severe loss of height as well with no significant stenosis seen.  SI joints are mildly inflamed with right being worse than left.      Medical Decision Making:    Ms. Deluca is a 56 y/o female being seen as a new patient to clinic for back pain with right-sided sciatica.  Patient's clinical presentation correlating with imaging for sacroiliac dysfunction as well as degenerative disc disease and spondylosis of the lumbar spine.  Patient more symptomatic with the sacroiliac dysfunction at this time.  We will send her to pain management for dedicated right SI joint injection.  I will also send her for lumbar flexion-extension films as patient would like to wait on MRI at this time.  If patient cannot receive any relief from the SI joint injection further attention will be devoted to her lower lumbar spine for additional treatment recommendations.      Procedures      Diagnoses and all orders for this visit:    1. Chronic bilateral low back pain without sciatica (Primary)  -     XR Spine  Lumbar Flex & Ext; Future    2. Sacroiliac dysfunction  -     Ambulatory Referral to Pain Management    3. DDD (degenerative disc disease), lumbar  -     XR Spine Lumbar Flex & Ext; Future    Other orders  -     cyclobenzaprine (FLEXERIL) 10 MG tablet; Take 0.5 tablets by mouth At Night As Needed for Muscle Spasms.  Dispense: 30 tablet; Refill: 0      Return for Next scheduled follow up.    This patient was examined wearing appropriate personal protective equipment.     Patient reports former tobacco use.  Recommendations for continued cessation/avoidance of tobacco products.    Patient's blood pressure was reviewed.  Recommendations for  a low-salt diet and exercise to maintain/improve BP in addition to taking any presribed medications.    Patient's Body mass index is 24.96 kg/m². BMI is within normal parameters. No follow-up required..           YESICA Coreas  04/06/21  12:23 EDT

## 2021-04-12 ENCOUNTER — OFFICE VISIT (OUTPATIENT)
Dept: PAIN MEDICINE | Facility: CLINIC | Age: 56
End: 2021-04-12

## 2021-04-12 VITALS
BODY MASS INDEX: 24.44 KG/M2 | SYSTOLIC BLOOD PRESSURE: 100 MMHG | WEIGHT: 165 LBS | OXYGEN SATURATION: 97 % | RESPIRATION RATE: 16 BRPM | HEIGHT: 69 IN | HEART RATE: 79 BPM | DIASTOLIC BLOOD PRESSURE: 67 MMHG | TEMPERATURE: 97.8 F

## 2021-04-12 DIAGNOSIS — M47.816 LUMBAR SPONDYLOSIS: ICD-10-CM

## 2021-04-12 DIAGNOSIS — M53.3 SACROILIAC JOINT DYSFUNCTION: Primary | ICD-10-CM

## 2021-04-12 DIAGNOSIS — M51.36 DDD (DEGENERATIVE DISC DISEASE), LUMBAR: ICD-10-CM

## 2021-04-12 PROCEDURE — 99204 OFFICE O/P NEW MOD 45 MIN: CPT | Performed by: STUDENT IN AN ORGANIZED HEALTH CARE EDUCATION/TRAINING PROGRAM

## 2021-04-12 NOTE — PROGRESS NOTES
CHIEF COMPLAINT  Lower back pain and R SI joint dysfunction       Subjective   Deena Deluca is a 55 y.o. female who was referred by Ofe Reynoso APRN  to our pain management clinic for consultation, evaluation and treatment of lower back pain and R SI Joint injection.  Lower back pain started 15 years ago but has recently worsened. She was able to tolerate before with PT and home exercises.  She has recently moved here from [place] and she had not undergone some physical therapy in [place] 8 months ago ( took skilled nursing) for her lower back pain.    Lower back pain is 1/10 on VAS, at maximum is 7/10. Pain is burning, sharp and stabbing in nature. Pain is referred right hip, buttock to right posterior thigh stops at the knee. The pain is intermittent. The pain is improved by ice pack, ibuprofen, pressure in back. The pain is worse with prolonged standing, walking, sitting.  Has noticed some muscular atrophy in her right calf. She has intermittent tingling in R lateral foot and R lateral thigh. She is not even able to move when she gets this unpredictable back pain.     Hx: She has has 2 adopted children 21 and 17.    SOAPP- 2   PHQ-9- 4     PMH:   History of breast cancer s/p lumpectomy, hyperlipidemia    Current Medications:   Flexeril 5 mg nightly as needed - makes it drowsy  Cymbalta 30 mg daily for 7 years.     Past Medications:    Past Modalities:  TENS:       no          Physical Therapy Within The Last 6 Months     no  Psychotherapy     no  Massage Therapy      no    Patient Complains Of:  Uro-Fecal Incontinence no  Weight Gain/Loss  no  Fever/Chills   no  Weakness   no      PEG Assessment   What number best describes your pain on average in the past week?6  What number best describes how, during the past week, pain has interfered with your enjoyment of life?7  What number best describes how, during the past week, pain has interfered with your general activity?  7        Current Outpatient  Medications:   •  cyclobenzaprine (FLEXERIL) 10 MG tablet, Take 0.5 tablets by mouth At Night As Needed for Muscle Spasms., Disp: 30 tablet, Rfl: 0  •  DULoxetine (CYMBALTA) 30 MG capsule, Take 1 capsule by mouth Daily., Disp: 30 capsule, Rfl: 0  •  sulfamethoxazole-trimethoprim (Bactrim DS) 800-160 MG per tablet, Take 1 tablet by mouth 2 (Two) Times a Day., Disp: 6 tablet, Rfl: 0    The following portions of the patient's history were reviewed and updated as appropriate: allergies, current medications, past family history, past medical history, past social history, past surgical history and problem list.      REVIEW OF PERTINENT MEDICAL DATA    Past Medical History:   Diagnosis Date   • Arthritis    • Breast cancer (CMS/HCC)    • Hyperlipidemia    • Low back pain      Past Surgical History:   Procedure Laterality Date   • APPENDECTOMY     • BREAST LUMPECTOMY Left     Diagnosed in ; Treatment continued until      Family History   Problem Relation Age of Onset   • Hyperthyroidism Mother    • Retinitis pigmentosa Mother    • Arthritis Mother    • No Known Problems Father    • Diabetes type I Son      Social History     Socioeconomic History   • Marital status:      Spouse name: Not on file   • Number of children: Not on file   • Years of education: Not on file   • Highest education level: Not on file   Tobacco Use   • Smoking status: Former Smoker     Packs/day: 1.00     Years: 13.00     Pack years: 13.00     Types: Cigarettes     Start date:      Quit date:      Years since quittin.2   • Smokeless tobacco: Never Used   Vaping Use   • Vaping Use: Never used   Substance and Sexual Activity   • Alcohol use: Not Currently   • Drug use: Not Currently     Comment: history of methamphetamine- Sober since    • Sexual activity: Not Currently         Review of Systems   Musculoskeletal: Positive for arthralgias and back pain.         Vitals:    21 1451   BP: 100/67   BP Location: Left  "arm   Patient Position: Sitting   Pulse: 79   Resp: 16   Temp: 97.8 °F (36.6 °C)   SpO2: 97%   Weight: 74.8 kg (165 lb)   Height: 175.3 cm (69\")   PainSc:   1         Objective   Physical Exam  Musculoskeletal:         General: Tenderness present.        Legs:    Neurological:      Deep Tendon Reflexes:      Reflex Scores:       Patellar reflexes are 2+ on the right side and 2+ on the left side.       Achilles reflexes are 2+ on the right side and 2+ on the left side.     Comments: Motor strength 5/5 b/l LE  Sensory intact b/l LE             Imaging Reviewed:  CT abdomen and pelvis 12/29/2020     Multilevel degenerative changes most noted at L1-L2 L3-L4 and L5-S1.  Patient has severe disc height loss and L5-S1 with Modic changes on superior endplate of S1.  L3-L4 demonstrates mild loss of height with central disc bulges producing moderate canal stenosis and moderate to severe foraminal narrowing.  L1-L2 demonstrate severe loss of height as well with no significant stenosis seen.  SI joints are mildly inflamed with right being worse than left.         Assessment:    1. Sacroiliac joint dysfunction    2. Lumbar spondylosis    3. DDD (degenerative disc disease), lumbar         Plan:  1. Will defer UDS for now.    2.  She has done physical therapy in the past and regularly does home exercises with minimal relief.  3. Patient has pain in the lower back,  b/l SI joint tenderness was positive. Shira test, SI joint compression and thigh thurst test were performed and were positive for SI joint pathology.  CT abdomen pelvis also shows inflamed SI joints bilaterally right worse than left.  Discussed B/l SI joint injection under fluoro, Discussed the possibility of infection, bleeding, nerve damage, headache, increased pain, paraplegia. Patient understands and agrees.  4.  Discussed with patient that tingling in her right foot and some of her radicular pain could be originating from her lower back and it may require lumbar MRI " in future to evaluate.  Patient wants to hold off for now due to concern about cost.  We will consider it in future if the pain does not improve with SI joint injections.  5.  Recommended to continue Flexeril and Cymbalta as prescribed for now.    RTC for injections and then 3 week follow up.     Breezy Donnelly DO  Pain Management   [facility]               INSPECT REPORT    As part of the patient's treatment plan, I may be prescribing controlled substances. The patient has been made aware of appropriate use of such medications, including potential risk of somnolence, limited ability to drive and/or work safely, and the potential for dependence or overdose. It has also been made clear that these medications are for use by this patient only, without concomitant use of alcohol or other substances unless prescribed.     Patient has completed prescribing agreement detailing terms of continued prescribing of controlled substances, including monitoring INSPECT reports, urine drug screening, and pill counts if necessary. The patient is aware that inappropriate use will results in cessation of prescribing such medications.    INSPECT report has been reviewed and scanned into the patient's chart.

## 2021-05-04 ENCOUNTER — APPOINTMENT (OUTPATIENT)
Dept: PAIN MEDICINE | Facility: HOSPITAL | Age: 56
End: 2021-05-04

## 2021-06-04 ENCOUNTER — OFFICE VISIT (OUTPATIENT)
Dept: FAMILY MEDICINE CLINIC | Facility: CLINIC | Age: 56
End: 2021-06-04

## 2021-06-04 VITALS
HEART RATE: 95 BPM | WEIGHT: 169 LBS | BODY MASS INDEX: 25.03 KG/M2 | TEMPERATURE: 98 F | SYSTOLIC BLOOD PRESSURE: 111 MMHG | HEIGHT: 69 IN | RESPIRATION RATE: 16 BRPM | OXYGEN SATURATION: 97 % | DIASTOLIC BLOOD PRESSURE: 69 MMHG

## 2021-06-04 DIAGNOSIS — M19.90 ARTHRITIS: Primary | ICD-10-CM

## 2021-06-04 DIAGNOSIS — R52 PAIN: ICD-10-CM

## 2021-06-04 PROCEDURE — 99213 OFFICE O/P EST LOW 20 MIN: CPT | Performed by: NURSE PRACTITIONER

## 2021-06-04 RX ORDER — DULOXETIN HYDROCHLORIDE 30 MG/1
30 CAPSULE, DELAYED RELEASE ORAL DAILY
Qty: 90 CAPSULE | Refills: 0 | Status: SHIPPED | OUTPATIENT
Start: 2021-06-04 | End: 2021-08-31

## 2021-08-30 DIAGNOSIS — R52 PAIN: ICD-10-CM

## 2021-08-31 RX ORDER — DULOXETIN HYDROCHLORIDE 30 MG/1
CAPSULE, DELAYED RELEASE ORAL
Qty: 90 CAPSULE | Refills: 0 | Status: SHIPPED | OUTPATIENT
Start: 2021-08-31 | End: 2022-01-03

## 2021-08-31 NOTE — TELEPHONE ENCOUNTER
Rx Refill Note  Requested Prescriptions     Pending Prescriptions Disp Refills   • DULoxetine (CYMBALTA) 30 MG capsule [Pharmacy Med Name: DULOXETINE HCL DR 30 MG CAP] 90 capsule 0     Sig: TAKE 1 CAPSULE BY MOUTH EVERY DAY      Last office visit with prescribing clinician: 6/4/2021      Next office visit with prescribing clinician: Visit date not found            Yaz Chatman LPN  08/31/21, 12:44 EDT

## 2021-11-19 ENCOUNTER — OFFICE VISIT (OUTPATIENT)
Dept: FAMILY MEDICINE CLINIC | Facility: CLINIC | Age: 56
End: 2021-11-19

## 2021-11-19 DIAGNOSIS — J02.9 SORE THROAT: ICD-10-CM

## 2021-11-19 DIAGNOSIS — H65.01 RIGHT ACUTE SEROUS OTITIS MEDIA, RECURRENCE NOT SPECIFIED: Primary | ICD-10-CM

## 2021-11-19 PROCEDURE — 99213 OFFICE O/P EST LOW 20 MIN: CPT | Performed by: NURSE PRACTITIONER

## 2021-11-19 RX ORDER — AMOXICILLIN 500 MG/1
500 CAPSULE ORAL 2 TIMES DAILY
Qty: 20 CAPSULE | Refills: 0 | Status: SHIPPED | OUTPATIENT
Start: 2021-11-19 | End: 2021-11-29

## 2022-01-01 DIAGNOSIS — R52 PAIN: ICD-10-CM

## 2022-01-03 RX ORDER — DULOXETIN HYDROCHLORIDE 30 MG/1
CAPSULE, DELAYED RELEASE ORAL
Qty: 90 CAPSULE | Refills: 0 | Status: SHIPPED | OUTPATIENT
Start: 2022-01-03 | End: 2022-04-07

## 2022-01-03 NOTE — TELEPHONE ENCOUNTER
Rx Refill Note    PROTOCOLS NOT MET     Requested Prescriptions     Pending Prescriptions Disp Refills   • DULoxetine (CYMBALTA) 30 MG capsule [Pharmacy Med Name: DULOXETINE HCL DR 30 MG CAP] 90 capsule 0     Sig: TAKE 1 CAPSULE BY MOUTH EVERY DAY      Last office visit with prescribing clinician: 11/19/2021      Next office visit with prescribing clinician: NONE            Yaz Chatman LPN  01/03/22, 09:20 EST

## 2022-01-05 ENCOUNTER — TELEPHONE (OUTPATIENT)
Dept: FAMILY MEDICINE CLINIC | Facility: CLINIC | Age: 57
End: 2022-01-05

## 2022-01-05 NOTE — TELEPHONE ENCOUNTER
PATIENT CALLING IN REGARD TO REQUEST A CALLBACK ABOUT A PAIN MGMT REFERRAL . PLEASE ADVISE THANK YOU!

## 2022-01-06 NOTE — TELEPHONE ENCOUNTER
AVANI,  DO YOU MIND TO CONTACT PATIENT AND LET HER KNOW THAT ARTHUR HAS RESPONDED. PLEASE SEE THE FOLLOWING FOR ARTHUR'S ANSWER.   I recommend her to be seen next week to discuss this further.    PATIENT JUST CALLED IN REGARDS TO ACQUIRING A PAIN MANAGEMENT REFERRAL YESTERDAY.     THANK YOU SO MUCH!

## 2022-01-06 NOTE — TELEPHONE ENCOUNTER
Caller: Deena Deluca    Relationship to patient: Self    Best call back number: [phone]    Patient is needing: PT IS CALLING ONCE MORE IN REGARDS TO THE PAIN MANAGEMENT REFERRAL

## 2022-01-11 ENCOUNTER — OFFICE VISIT (OUTPATIENT)
Dept: FAMILY MEDICINE CLINIC | Facility: CLINIC | Age: 57
End: 2022-01-11

## 2022-01-11 VITALS
BODY MASS INDEX: 25.33 KG/M2 | HEART RATE: 74 BPM | OXYGEN SATURATION: 99 % | WEIGHT: 171 LBS | SYSTOLIC BLOOD PRESSURE: 111 MMHG | HEIGHT: 69 IN | DIASTOLIC BLOOD PRESSURE: 70 MMHG | TEMPERATURE: 98 F

## 2022-01-11 DIAGNOSIS — M54.41 ACUTE MIDLINE LOW BACK PAIN WITH RIGHT-SIDED SCIATICA: Primary | ICD-10-CM

## 2022-01-11 PROCEDURE — 99213 OFFICE O/P EST LOW 20 MIN: CPT | Performed by: NURSE PRACTITIONER

## 2022-01-11 RX ORDER — MELOXICAM 7.5 MG/1
7.5 TABLET ORAL DAILY
Qty: 30 TABLET | Refills: 0 | Status: SHIPPED | OUTPATIENT
Start: 2022-01-11 | End: 2022-02-07

## 2022-01-18 DIAGNOSIS — M54.41 ACUTE MIDLINE LOW BACK PAIN WITH RIGHT-SIDED SCIATICA: ICD-10-CM

## 2022-01-20 ENCOUNTER — TELEPHONE (OUTPATIENT)
Dept: FAMILY MEDICINE CLINIC | Facility: CLINIC | Age: 57
End: 2022-01-20

## 2022-01-20 NOTE — TELEPHONE ENCOUNTER
PLEASE FINISH OFFICE VISIT FOR THIS PATIENT FROM [facility] SO PAIN MANAGEMENT CAN REVIEW HER CHART/OFFICE VISIT.

## 2022-01-20 NOTE — TELEPHONE ENCOUNTER
Hub is instructed to read the documentation below to patient  CALLED PT. NO ANSWER. PER HIPAA, MAY LEAVE DETAILED VM.  LEFT ADVISING PT OF THE FOLLOWING REGARDING HER LUMBAR XRAY RESULTS:  - Kidney stone in right kidney. Did she know she had kidney stones?  - Degenerative changes to lumbar spine  - Will make referral to pain clinic as we discussed     IF ACCEPTED BY PAIN MANAGEMENT, THEIR PRACTICE WILL CONTACT HER TO SCHEDULE.

## 2022-01-20 NOTE — TELEPHONE ENCOUNTER
----- Message from YESICA Kam sent at 1/19/2022  2:31 PM EST -----  Kidney stone in right kidney  Degenerative changes to lumbar spine  Will make referral to pain clinic as we discussed  Did she know she had kidney stones?

## 2022-01-24 NOTE — PROGRESS NOTES
Chief Complaint   Patient presents with   • Back Pain     referral for pain mgmt  for back pain        Subjective   Deena Deluca is a 56 y.o.  female who presents today for   Has seen pain management in the past  Did have a low back flare last week that was resolved with rest and meloxicam  Did see Dr Donnelly in the past  • Back Pain    referral for pain mgmt  for back pain      Deena Deluca  has a past medical history of Arthritis, Breast cancer (HCC), Hyperlipidemia, and Low back pain.    Allergies   Allergen Reactions   • Morphine Nausea And Vomiting       Current Outpatient Medications:   •  DULoxetine (CYMBALTA) 30 MG capsule, TAKE 1 CAPSULE BY MOUTH EVERY DAY, Disp: 90 capsule, Rfl: 0  •  meloxicam (MOBIC) 7.5 MG tablet, Take 1 tablet by mouth Daily., Disp: 30 tablet, Rfl: 0  Past Medical History:   Diagnosis Date   • Arthritis    • Breast cancer (HCC)    • Hyperlipidemia    • Low back pain      Past Surgical History:   Procedure Laterality Date   • APPENDECTOMY     • BREAST LUMPECTOMY Left     Diagnosed in ; Treatment continued until      Social History     Socioeconomic History   • Marital status:    Tobacco Use   • Smoking status: Former Smoker     Packs/day: 1.00     Years: 13.00     Pack years: 13.00     Types: Cigarettes     Start date:      Quit date:      Years since quittin.0   • Smokeless tobacco: Never Used   Vaping Use   • Vaping Use: Never used   Substance and Sexual Activity   • Alcohol use: Not Currently   • Drug use: Not Currently     Comment: history of methamphetamine- Sober since    • Sexual activity: Not Currently     Family History   Problem Relation Age of Onset   • Hyperthyroidism Mother    • Retinitis pigmentosa Mother    • Arthritis Mother    • No Known Problems Father    • Diabetes type I Son      PHQ-2 Depression Screening  Little interest or pleasure in doing things? 0   Feeling down, depressed, or hopeless? 0   PHQ-2 Total Score 0  "    PHQ-9 Depression Screening  Little interest or pleasure in doing things? 0   Feeling down, depressed, or hopeless? 0   Trouble falling or staying asleep, or sleeping too much?     Feeling tired or having little energy?     Poor appetite or overeating?     Feeling bad about yourself - or that you are a failure or have let yourself or your family down?     Trouble concentrating on things, such as reading the newspaper or watching television?     Moving or speaking so slowly that other people could have noticed? Or the opposite - being so fidgety or restless that you have been moving around a lot more than usual?     Thoughts that you would be better off dead, or of hurting yourself in some way?     PHQ-9 Total Score 0   If you checked off any problems, how difficult have these problems made it for you to do your work, take care of things at home, or get along with other people?         Family history, surgical history, past medical history, Allergies and med's reviewed with patient today and updated in [facility] EMR.     ROS:  Review of Systems   Constitutional: Negative for activity change, appetite change and fatigue.   Respiratory: Negative for cough and shortness of breath.    Cardiovascular: Negative for chest pain and leg swelling.   Gastrointestinal: Negative for abdominal pain and nausea.   Endocrine: Negative for polydipsia, polyphagia and polyuria.   Musculoskeletal: Negative for arthralgias, back pain and myalgias.   Skin: Negative for rash.   Neurological: Negative for speech difficulty and headaches.   Psychiatric/Behavioral: Negative for confusion and decreased concentration.   All other systems reviewed and are negative.      OBJECTIVE:  Vitals:    01/11/22 1454   BP: 111/70   Pulse: 74   Temp: 98 °F (36.7 °C)   TempSrc: Infrared   SpO2: 99%   Weight: 77.6 kg (171 lb)   Height: 175.3 cm (69\")     Body mass index is 25.25 kg/m².  Physical Exam  Vitals and nursing note reviewed.   Constitutional:       " Appearance: She is well-developed.   HENT:      Head: Normocephalic and atraumatic.      Mouth/Throat:      Mouth: Mucous membranes are dry.   Eyes:      Conjunctiva/sclera: Conjunctivae normal.      Pupils: Pupils are equal, round, and reactive to light.   Cardiovascular:      Rate and Rhythm: Normal rate and regular rhythm.      Heart sounds: Normal heart sounds.   Pulmonary:      Effort: Pulmonary effort is normal.      Breath sounds: Normal breath sounds.   Abdominal:      General: Bowel sounds are normal.      Palpations: Abdomen is soft.   Musculoskeletal:         General: Normal range of motion.      Cervical back: Normal range of motion and neck supple.   Skin:     General: Skin is warm and dry.   Neurological:      Mental Status: She is alert and oriented to person, place, and time.   Psychiatric:         Behavior: Behavior normal.         Thought Content: Thought content normal.         Judgment: Judgment normal.         ASSESSMENT/ PLAN:    Diagnoses and all orders for this visit:    1. Acute midline low back pain with right-sided sciatica (Primary)  -     XR Spine Lumbar 2 or 3 View; Future  -     Ambulatory Referral to Pain Management    Other orders  -     meloxicam (MOBIC) 7.5 MG tablet; Take 1 tablet by mouth Daily.  Dispense: 30 tablet; Refill: 0        Orders Placed Today:     New Medications Ordered This Visit   Medications   • meloxicam (MOBIC) 7.5 MG tablet     Sig: Take 1 tablet by mouth Daily.     Dispense:  30 tablet     Refill:  0        Management Plan:   Repeat xray  Restart meloxicam  Referral to pain management     An After Visit Summary was printed and given to the patient at discharge.    Follow-up: Return if symptoms worsen or fail to improve.    Stephy Anna, APRN 1/24/2022 11:48 EST  This note was electronically signed.

## 2022-01-31 ENCOUNTER — TELEPHONE (OUTPATIENT)
Dept: PAIN MEDICINE | Facility: CLINIC | Age: 57
End: 2022-01-31

## 2022-01-31 NOTE — TELEPHONE ENCOUNTER
PT CALLED SAID THAT SHE GOT NEW IMAGING AND NOW WANTS AN INJECTION HASN'T BEEN HERE SINCE 4/21 DO YOU WANT INJECTION OR FOLLOW UP?

## 2022-01-31 NOTE — TELEPHONE ENCOUNTER
Sure. She can make an appointment in office. I think I saw her at [place], but I can see her wherever.

## 2022-02-03 NOTE — PROGRESS NOTES
Subjective   eDena Deluca is a 56 y.o. female is here for follow up for lower back pain. Patient was last seen on 4/2021.  She was recommended bilateral SI joint injections but patient canceled them due to financial reasons..     On last visit:     Lower back pain is 1/10 on VAS, at maximum 7/10.  Pain is burning, sharp, stabbing in nature.  Pain is referred to right hip, buttock to right posterior thigh stopping at the knee.  Pain is intermittent.  Improved by ice pack, ibuprofen, pressure in the back.  Pain is worse with prolonged standing, walking, sitting.  She has also noticed some muscular atrophy in her right calf.  She has intermittent tingling in the right lateral foot and right lateral thigh.    Previous Injection:      Hx: Referred by Ofe Reynoso APRN  for  lower back pain and R SI Joint injection.  Lower back pain started 15 years ago but has recently worsened. She was able to tolerate before with PT and home exercises.  She has recently moved here from [place] and she had not undergone some physical therapy in [place] 8 months ago ( took residential) for her lower back pain.       Hx: She has has 2 adopted children 21 and 17.     SOAPP- 2   PHQ-9- 4      PMH:   History of breast cancer s/p lumpectomy, hyperlipidemia     Current Medications:   Flexeril 5 mg nightly as needed - makes it drowsy  Cymbalta 30 mg daily for 7 years.      Past Medications:     Past Modalities:  TENS:                                                                          no                                                  Physical Therapy Within The Last 6 Months              no  Psychotherapy                                                            no  Massage Therapy                                                       no     Patient Complains Of:  Uro-Fecal Incontinence          no  Weight Gain/Loss                   no  Fever/Chills                             no  Weakness                               no          Current Outpatient Medications:   •  DULoxetine (CYMBALTA) 30 MG capsule, TAKE 1 CAPSULE BY MOUTH EVERY DAY, Disp: 90 capsule, Rfl: 0  •  meloxicam (MOBIC) 7.5 MG tablet, Take 1 tablet by mouth Daily., Disp: 30 tablet, Rfl: 0    The following portions of the patient's history were reviewed and updated as appropriate: allergies, current medications, past family history, past medical history, past social history, past surgical history, and problem list.      REVIEW OF PERTINENT MEDICAL DATA    Past Medical History:   Diagnosis Date   • Arthritis    • Breast cancer (HCC)    • Hyperlipidemia    • Low back pain      Past Surgical History:   Procedure Laterality Date   • APPENDECTOMY     • BREAST LUMPECTOMY Left     Diagnosed in ; Treatment continued until      Family History   Problem Relation Age of Onset   • Hyperthyroidism Mother    • Retinitis pigmentosa Mother    • Arthritis Mother    • No Known Problems Father    • Diabetes type I Son      Social History     Socioeconomic History   • Marital status:    Tobacco Use   • Smoking status: Former Smoker     Packs/day: 1.00     Years: 13.00     Pack years: 13.00     Types: Cigarettes     Start date:      Quit date:      Years since quittin.1   • Smokeless tobacco: Never Used   Vaping Use   • Vaping Use: Never used   Substance and Sexual Activity   • Alcohol use: Not Currently   • Drug use: Not Currently     Comment: history of methamphetamine- Sober since    • Sexual activity: Not Currently         Review of Systems      There were no vitals filed for this visit.      Objective   Physical Exam      Imaging Reviewed:  X-ray lumbar spine-2022  -Multilevel mild hypertrophic posterior facet degenerative changes.  -Evidence of right nephrolithiasis.    CT abdomen and pelvis 2020     Multilevel degenerative changes most noted at L1-L2 L3-L4 and L5-S1.  Patient has severe disc height loss and L5-S1 with Modic changes on  superior endplate of S1.  L3-L4 demonstrates mild loss of height with central disc bulges producing moderate canal stenosis and moderate to severe foraminal narrowing.  L1-L2 demonstrate severe loss of height as well with no significant stenosis seen.  SI joints are mildly inflamed with right being worse than left.         Assessment:    No diagnosis found.     1. Will defer UDS for now.    2.  She has done physical therapy in the past and regularly does home exercises with minimal relief.  3. Patient has pain in the lower back,  b/l SI joint tenderness was positive. Shira test, SI joint compression and thigh thurst test were performed and were positive for SI joint pathology.  CT abdomen pelvis also shows inflamed SI joints bilaterally right worse than left.  Discussed B/l SI joint injection under fluoro, Discussed the possibility of infection, bleeding, nerve damage, headache, increased pain, paraplegia. Patient understands and agrees.  4.  Discussed with patient that tingling in her right foot and some of her radicular pain could be originating from her lower back and it may require lumbar MRI in future to evaluate.  Patient wants to hold off for now due to concern about cost.  We will consider it in future if the pain does not improve with SI joint injections.  5.  Recommended to continue Flexeril and Cymbalta as prescribed for now.     RTC for injections and then 3 week follow up.      Breezy Donnelly DO  Pain Management   [facility]                    INSPECT REPORT    As part of the patient's treatment plan, I may be prescribing controlled substances. The patient has been made aware of appropriate use of such medications, including potential risk of somnolence, limited ability to drive and/or work safely, and the potential for dependence or overdose. It has also been made clear that these medications are for use by this patient only, without concomitant use of alcohol or other substances unless prescribed.      Patient has completed prescribing agreement detailing terms of continued prescribing of controlled substances, including monitoring INSPECT reports, urine drug screening, and pill counts if necessary. The patient is aware that inappropriate use will results in cessation of prescribing such medications.    INSPECT report has been reviewed and scanned into the patient's chart.

## 2022-02-07 ENCOUNTER — APPOINTMENT (OUTPATIENT)
Dept: PAIN MEDICINE | Facility: CLINIC | Age: 57
End: 2022-02-07

## 2022-02-07 ENCOUNTER — TELEPHONE (OUTPATIENT)
Dept: PAIN MEDICINE | Facility: CLINIC | Age: 57
End: 2022-02-07

## 2022-02-07 RX ORDER — MELOXICAM 7.5 MG/1
TABLET ORAL
Qty: 30 TABLET | Refills: 0 | Status: SHIPPED | OUTPATIENT
Start: 2022-02-07 | End: 2022-03-14

## 2022-02-07 NOTE — TELEPHONE ENCOUNTER
Caller: DARLINE MCCULLOUGH    Relationship to patient: SELF    Best call back number:     Patient is needing: UNABLE TO WARM TRANSFER.  PATIENT HAS A SMALL HEAD COLD AND WANTS TO KNOW IF ITS OK IF SHE STILL COMES.  NO FEVER OR ANYTHING

## 2022-02-07 NOTE — TELEPHONE ENCOUNTER
Rx Refill Note    PROTOCOLS NOT MET     Requested Prescriptions     Pending Prescriptions Disp Refills   • meloxicam (MOBIC) 7.5 MG tablet [Pharmacy Med Name: MELOXICAM 7.5 MG TABLET] 30 tablet 0     Sig: TAKE 1 TABLET BY MOUTH EVERY DAY      Last office visit with prescribing clinician: 1/11/2022      Next office visit with prescribing clinician:        CARE COORDINATION - SCAN - LABS/[facility]/[id number] (04/16/2021)         Yaz Chatman LPN  02/07/22, 10:31 EST

## 2022-02-15 NOTE — PROGRESS NOTES
Subjective   Deena Deluca is a 56 y.o. female is here for follow up for lower back pain. Patient was last seen on 4/2021.  She was recommended bilateral SI joint injections but patient canceled them due to financial reasons. She states that she has intermittent pain which is unpredictable. She had 2 episodes of excruciating pain since last visit.    On last visit:     Lower back pain is 0/10 on VAS, at maximum 10/10.  Pain is burning, sharp, stabbing in nature.  Pain is referred to right hip, buttock to right posterior thigh stopping at the knee.  Pain is intermittent.  Improved by ice pack, ibuprofen, pressure in the back.  Pain is worse with prolonged standing, walking, sitting.  She has also noticed some muscular atrophy in her right calf.     Previous Injection:      Hx: Referred by Ofe Reynoso APRN  for  lower back pain and R SI Joint injection.  Lower back pain started 15 years ago but has recently worsened. She was able to tolerate before with PT and home exercises.  She has recently moved here from [place] and she had not undergone some physical therapy in [place] 8 months ago ( took custodial) for her lower back pain.       Hx: She has has 2 adopted children 21 and 17.     SOAPP- 2   PHQ-9- 4      PMH:   History of breast cancer s/p lumpectomy, hyperlipidemia     Current Medications:   Flexeril 5 mg nightly as needed - makes it drowsy  Cymbalta 30 mg daily for 7 years.      Past Medications:     Past Modalities:  TENS:                                                                          no                                                  Physical Therapy Within The Last 6 Months              no  Psychotherapy                                                            no  Massage Therapy                                                       no     Patient Complains Of:  Uro-Fecal Incontinence          no  Weight Gain/Loss                   no  Fever/Chills                              "no  Weakness                               no         Current Outpatient Medications:   •  DULoxetine (CYMBALTA) 30 MG capsule, TAKE 1 CAPSULE BY MOUTH EVERY DAY, Disp: 90 capsule, Rfl: 0  •  meloxicam (MOBIC) 7.5 MG tablet, TAKE 1 TABLET BY MOUTH EVERY DAY (Patient taking differently: As needed), Disp: 30 tablet, Rfl: 0    The following portions of the patient's history were reviewed and updated as appropriate: allergies, current medications, past family history, past medical history, past social history, past surgical history, and problem list.      REVIEW OF PERTINENT MEDICAL DATA    Past Medical History:   Diagnosis Date   • Arthritis    • Breast cancer (HCC)    • Hip pain    • Hyperlipidemia    • Leg pain    • Low back pain      Past Surgical History:   Procedure Laterality Date   • APPENDECTOMY     • BREAST LUMPECTOMY Left     Diagnosed in ; Treatment continued until      Family History   Problem Relation Age of Onset   • Hyperthyroidism Mother    • Retinitis pigmentosa Mother    • Arthritis Mother    • No Known Problems Father    • Diabetes type I Son      Social History     Socioeconomic History   • Marital status:    Tobacco Use   • Smoking status: Former Smoker     Packs/day: 1.00     Years: 13.00     Pack years: 13.00     Types: Cigarettes     Start date:      Quit date:      Years since quittin.   • Smokeless tobacco: Never Used   Vaping Use   • Vaping Use: Never used   Substance and Sexual Activity   • Alcohol use: Not Currently   • Drug use: Not Currently     Comment: history of methamphetamine- Sober since    • Sexual activity: Not Currently         Review of Systems   Musculoskeletal: Positive for arthralgias and back pain.         Vitals:    22 1548   BP: 107/76   Pulse: 80   Resp: 16   SpO2: 97%   Weight: 74.8 kg (165 lb)   Height: 175.3 cm (69\")   PainSc: 0-No pain         Objective   Physical Exam  Musculoskeletal:         General: Tenderness present.    "     Legs:            Imaging Reviewed:  X-ray lumbar spine-1/18/2022  -Multilevel mild hypertrophic posterior facet degenerative changes.  -Evidence of right nephrolithiasis.    CT abdomen and pelvis 12/29/2020     Multilevel degenerative changes most noted at L1-L2 L3-L4 and L5-S1.  Patient has severe disc height loss and L5-S1 with Modic changes on superior endplate of S1.  L3-L4 demonstrates mild loss of height with central disc bulges producing moderate canal stenosis and moderate to severe foraminal narrowing.  L1-L2 demonstrate severe loss of height as well with no significant stenosis seen.  SI joints are mildly inflamed with right being worse than left.         Assessment:    1. Sacroiliac joint dysfunction    2. Lumbar spondylosis       Plan:   1. Will defer UDS for now.    2.  She has done physical therapy in the past and regularly does home exercises with minimal relief.  3. Patient has pain in the lower back,  b/l SI joint tenderness was positive. Shira test, SI joint compression and thigh thurst test were performed and were positive for SI joint pathology.  CT abdomen pelvis also shows inflamed SI joints bilaterally right worse than left.  Discussed B/l SI joint injection under fluoro, Discussed the possibility of infection, bleeding, nerve damage, headache, increased pain, paraplegia. Patient understands and agrees. Patient is currently not in any pain. She would like to wait till pain returns and will give us a call to schedule for the injections.       RTC for injections as needed.     Breezy Donnelly DO  Pain Management   [facility]                    INSPECT REPORT    As part of the patient's treatment plan, I may be prescribing controlled substances. The patient has been made aware of appropriate use of such medications, including potential risk of somnolence, limited ability to drive and/or work safely, and the potential for dependence or overdose. It has also been made clear that these  medications are for use by this patient only, without concomitant use of alcohol or other substances unless prescribed.     Patient has completed prescribing agreement detailing terms of continued prescribing of controlled substances, including monitoring INSPECT reports, urine drug screening, and pill counts if necessary. The patient is aware that inappropriate use will results in cessation of prescribing such medications.    INSPECT report has been reviewed and scanned into the patient's chart.

## 2022-02-16 ENCOUNTER — OFFICE VISIT (OUTPATIENT)
Dept: PAIN MEDICINE | Facility: CLINIC | Age: 57
End: 2022-02-16

## 2022-02-16 VITALS
SYSTOLIC BLOOD PRESSURE: 107 MMHG | OXYGEN SATURATION: 97 % | RESPIRATION RATE: 16 BRPM | HEIGHT: 69 IN | HEART RATE: 80 BPM | WEIGHT: 165 LBS | DIASTOLIC BLOOD PRESSURE: 76 MMHG | BODY MASS INDEX: 24.44 KG/M2

## 2022-02-16 DIAGNOSIS — M47.816 LUMBAR SPONDYLOSIS: ICD-10-CM

## 2022-02-16 DIAGNOSIS — M53.3 SACROILIAC JOINT DYSFUNCTION: Primary | ICD-10-CM

## 2022-02-16 PROCEDURE — 99213 OFFICE O/P EST LOW 20 MIN: CPT | Performed by: STUDENT IN AN ORGANIZED HEALTH CARE EDUCATION/TRAINING PROGRAM

## 2022-03-14 RX ORDER — MELOXICAM 7.5 MG/1
TABLET ORAL
Qty: 30 TABLET | Refills: 0 | Status: SHIPPED | OUTPATIENT
Start: 2022-03-14

## 2022-03-14 NOTE — TELEPHONE ENCOUNTER
Rx Refill Note    PROTOCOLS NOT MET     Requested Prescriptions     Pending Prescriptions Disp Refills   • meloxicam (MOBIC) 7.5 MG tablet [Pharmacy Med Name: MELOXICAM 7.5 MG TABLET] 30 tablet 0     Sig: TAKE 1 TABLET BY MOUTH EVERY DAY      Last office visit with prescribing clinician: 1/11/2022      Next office visit with prescribing clinician: NONE     Comprehensive Metabolic Panel (11/06/2020 13:24)          Yaz Chatman LPN  03/14/22, 13:02 EDT

## 2022-04-05 ENCOUNTER — OFFICE VISIT (OUTPATIENT)
Dept: FAMILY MEDICINE CLINIC | Facility: CLINIC | Age: 57
End: 2022-04-05

## 2022-04-05 VITALS
HEIGHT: 69 IN | RESPIRATION RATE: 18 BRPM | BODY MASS INDEX: 25.33 KG/M2 | SYSTOLIC BLOOD PRESSURE: 112 MMHG | OXYGEN SATURATION: 97 % | HEART RATE: 73 BPM | DIASTOLIC BLOOD PRESSURE: 70 MMHG | WEIGHT: 171 LBS

## 2022-04-05 DIAGNOSIS — E04.9 ENLARGED THYROID GLAND: ICD-10-CM

## 2022-04-05 DIAGNOSIS — R42 DIZZINESS: ICD-10-CM

## 2022-04-05 DIAGNOSIS — Z13.29 SCREENING FOR THYROID DISORDER: ICD-10-CM

## 2022-04-05 DIAGNOSIS — Z13.1 SCREENING FOR DIABETES MELLITUS: Primary | ICD-10-CM

## 2022-04-05 DIAGNOSIS — R41.3 MEMORY LOSS: ICD-10-CM

## 2022-04-05 LAB
ALBUMIN SERPL-MCNC: 4.7 G/DL (ref 3.5–5.2)
ALBUMIN/GLOB SERPL: 2.1 G/DL
ALP SERPL-CCNC: 74 U/L (ref 39–117)
ALT SERPL W P-5'-P-CCNC: 19 U/L (ref 1–33)
ANION GAP SERPL CALCULATED.3IONS-SCNC: 9.2 MMOL/L (ref 5–15)
AST SERPL-CCNC: 20 U/L (ref 1–32)
BASOPHILS # BLD AUTO: 0.11 10*3/MM3 (ref 0–0.2)
BASOPHILS NFR BLD AUTO: 1.8 % (ref 0–1.5)
BILIRUB SERPL-MCNC: 0.2 MG/DL (ref 0–1.2)
BUN SERPL-MCNC: 13 MG/DL (ref 6–20)
BUN/CREAT SERPL: 18.8 (ref 7–25)
CALCIUM SPEC-SCNC: 9.3 MG/DL (ref 8.6–10.5)
CHLORIDE SERPL-SCNC: 105 MMOL/L (ref 98–107)
CO2 SERPL-SCNC: 27.8 MMOL/L (ref 22–29)
CREAT SERPL-MCNC: 0.69 MG/DL (ref 0.57–1)
DEPRECATED RDW RBC AUTO: 41.5 FL (ref 37–54)
EGFRCR SERPLBLD CKD-EPI 2021: 100.7 ML/MIN/1.73
EOSINOPHIL # BLD AUTO: 0.28 10*3/MM3 (ref 0–0.4)
EOSINOPHIL NFR BLD AUTO: 4.5 % (ref 0.3–6.2)
ERYTHROCYTE [DISTWIDTH] IN BLOOD BY AUTOMATED COUNT: 12.5 % (ref 12.3–15.4)
GLOBULIN UR ELPH-MCNC: 2.2 GM/DL
GLUCOSE SERPL-MCNC: 87 MG/DL (ref 65–99)
HCT VFR BLD AUTO: 36.7 % (ref 34–46.6)
HGB BLD-MCNC: 11.8 G/DL (ref 12–15.9)
IMM GRANULOCYTES # BLD AUTO: 0.01 10*3/MM3 (ref 0–0.05)
IMM GRANULOCYTES NFR BLD AUTO: 0.2 % (ref 0–0.5)
LYMPHOCYTES # BLD AUTO: 1.88 10*3/MM3 (ref 0.7–3.1)
LYMPHOCYTES NFR BLD AUTO: 30.3 % (ref 19.6–45.3)
MCH RBC QN AUTO: 29.4 PG (ref 26.6–33)
MCHC RBC AUTO-ENTMCNC: 32.2 G/DL (ref 31.5–35.7)
MCV RBC AUTO: 91.3 FL (ref 79–97)
MONOCYTES # BLD AUTO: 0.47 10*3/MM3 (ref 0.1–0.9)
MONOCYTES NFR BLD AUTO: 7.6 % (ref 5–12)
NEUTROPHILS NFR BLD AUTO: 3.46 10*3/MM3 (ref 1.7–7)
NEUTROPHILS NFR BLD AUTO: 55.6 % (ref 42.7–76)
NRBC BLD AUTO-RTO: 0 /100 WBC (ref 0–0.2)
PLATELET # BLD AUTO: 241 10*3/MM3 (ref 140–450)
PMV BLD AUTO: 10 FL (ref 6–12)
POTASSIUM SERPL-SCNC: 4 MMOL/L (ref 3.5–5.2)
PROT SERPL-MCNC: 6.9 G/DL (ref 6–8.5)
RBC # BLD AUTO: 4.02 10*6/MM3 (ref 3.77–5.28)
SODIUM SERPL-SCNC: 142 MMOL/L (ref 136–145)
T4 FREE SERPL-MCNC: 0.96 NG/DL (ref 0.93–1.7)
TSH SERPL DL<=0.05 MIU/L-ACNC: 2.63 UIU/ML (ref 0.27–4.2)
WBC NRBC COR # BLD: 6.21 10*3/MM3 (ref 3.4–10.8)

## 2022-04-05 PROCEDURE — 84443 ASSAY THYROID STIM HORMONE: CPT | Performed by: NURSE PRACTITIONER

## 2022-04-05 PROCEDURE — 80053 COMPREHEN METABOLIC PANEL: CPT | Performed by: NURSE PRACTITIONER

## 2022-04-05 PROCEDURE — 84439 ASSAY OF FREE THYROXINE: CPT | Performed by: NURSE PRACTITIONER

## 2022-04-05 PROCEDURE — 85025 COMPLETE CBC W/AUTO DIFF WBC: CPT | Performed by: NURSE PRACTITIONER

## 2022-04-05 PROCEDURE — 83036 HEMOGLOBIN GLYCOSYLATED A1C: CPT | Performed by: NURSE PRACTITIONER

## 2022-04-06 ENCOUNTER — TELEPHONE (OUTPATIENT)
Dept: FAMILY MEDICINE CLINIC | Facility: CLINIC | Age: 57
End: 2022-04-06

## 2022-04-06 LAB — HBA1C MFR BLD: 5.9 % (ref 3.5–5.6)

## 2022-04-06 NOTE — TELEPHONE ENCOUNTER
Caller: Deena Deluca    Relationship: Self    Best call back number: [phone]    What test was performed: LABS    When was the test performed: 4/5      Additional notes: RESULTS ARE IN MYCHART AND PATIENT WOULD LIKE A CALL TO GO OVER.

## 2022-04-07 DIAGNOSIS — R52 PAIN: ICD-10-CM

## 2022-04-07 RX ORDER — DULOXETIN HYDROCHLORIDE 30 MG/1
CAPSULE, DELAYED RELEASE ORAL
Qty: 90 CAPSULE | Refills: 0 | Status: SHIPPED | OUTPATIENT
Start: 2022-04-07 | End: 2022-07-07

## 2022-07-05 DIAGNOSIS — R52 PAIN: ICD-10-CM

## 2022-07-07 RX ORDER — DULOXETIN HYDROCHLORIDE 30 MG/1
CAPSULE, DELAYED RELEASE ORAL
Qty: 90 CAPSULE | Refills: 0 | Status: SHIPPED | OUTPATIENT
Start: 2022-07-07 | End: 2022-07-10 | Stop reason: SDUPTHER

## 2022-07-08 ENCOUNTER — TELEPHONE (OUTPATIENT)
Dept: FAMILY MEDICINE CLINIC | Facility: CLINIC | Age: 57
End: 2022-07-08

## 2022-07-08 NOTE — TELEPHONE ENCOUNTER
Caller: Deena Deluca    Relationship: Self    Best call back number: [phone]    Requested Prescriptions:       DULoxetine (CYMBALTA) 30 MG capsule     Pharmacy where request should be sent:    [facility]/pharmacy #[id number] - [place], [place] - [address] - [phone]  - [phone] FX  [phone]    Additional details provided by patient: PATIENT IS CALLING TO REQUEST AN EMERGENCY SUPPLY  TO LAST UNTIL 08/01/22 APPOINTMENT.  SHE IS ALSO WANTING TO KNOW IF THE APPOINTMENT COULD BE A VIDEO INSTEAD OF AN IN OFFICE APPOINTMENT.    Does the patient have less than a 3 day supply:  [x] Yes  [] No    Tere Foreman Rep   07/08/22 11:56 EDT     PLEASE ADVISE.

## 2022-07-10 DIAGNOSIS — R52 PAIN: ICD-10-CM

## 2022-07-10 RX ORDER — DULOXETIN HYDROCHLORIDE 30 MG/1
30 CAPSULE, DELAYED RELEASE ORAL DAILY
Qty: 90 CAPSULE | Refills: 0 | Status: SHIPPED | OUTPATIENT
Start: 2022-07-10 | End: 2022-07-19

## 2022-07-19 ENCOUNTER — TELEMEDICINE (OUTPATIENT)
Dept: FAMILY MEDICINE CLINIC | Facility: CLINIC | Age: 57
End: 2022-07-19

## 2022-07-19 DIAGNOSIS — F41.9 ANXIETY: Primary | ICD-10-CM

## 2022-07-19 PROCEDURE — 99213 OFFICE O/P EST LOW 20 MIN: CPT | Performed by: NURSE PRACTITIONER

## 2022-07-19 RX ORDER — DULOXETIN HYDROCHLORIDE 20 MG/1
20 CAPSULE, DELAYED RELEASE ORAL DAILY
Qty: 90 CAPSULE | Refills: 0 | Status: SHIPPED | OUTPATIENT
Start: 2022-07-19 | End: 2022-10-03

## 2022-08-20 DIAGNOSIS — F41.9 ANXIETY: ICD-10-CM

## 2022-08-22 RX ORDER — DULOXETIN HYDROCHLORIDE 20 MG/1
CAPSULE, DELAYED RELEASE ORAL
Qty: 90 CAPSULE | Refills: 0 | OUTPATIENT
Start: 2022-08-22

## 2022-08-22 NOTE — TELEPHONE ENCOUNTER
Hub is instructed to read the documentation below to patient  RX FOR CYMBALTA 20 MG WAS SENT TO [facility] IN [place] ON 7- FOR A 90 DAY SUPPLY. TOO SOON FOR REFILL AT THIS TIME.

## 2022-10-01 DIAGNOSIS — F41.9 ANXIETY: ICD-10-CM

## 2022-10-03 RX ORDER — DULOXETIN HYDROCHLORIDE 20 MG/1
CAPSULE, DELAYED RELEASE ORAL
Qty: 90 CAPSULE | Refills: 0 | Status: SHIPPED | OUTPATIENT
Start: 2022-10-03 | End: 2023-01-11

## 2022-10-27 ENCOUNTER — OFFICE VISIT (OUTPATIENT)
Dept: FAMILY MEDICINE CLINIC | Facility: CLINIC | Age: 57
End: 2022-10-27

## 2022-10-27 VITALS
HEIGHT: 69 IN | WEIGHT: 164.4 LBS | TEMPERATURE: 98.7 F | SYSTOLIC BLOOD PRESSURE: 105 MMHG | HEART RATE: 65 BPM | BODY MASS INDEX: 24.35 KG/M2 | DIASTOLIC BLOOD PRESSURE: 67 MMHG | OXYGEN SATURATION: 97 %

## 2022-10-27 DIAGNOSIS — D50.8 OTHER IRON DEFICIENCY ANEMIA: Primary | ICD-10-CM

## 2022-10-27 DIAGNOSIS — F41.9 ANXIETY: ICD-10-CM

## 2022-10-27 PROCEDURE — 99213 OFFICE O/P EST LOW 20 MIN: CPT | Performed by: NURSE PRACTITIONER

## 2022-10-27 NOTE — PROGRESS NOTES
Chief Complaint   Patient presents with   • Medication Problem     No problems. Med check.        Subjective   Deena Deluca is a 57 y.o.  female who presents today for to decrease Cymbalta  HPI   Reviewed d/c of Cymbalta to completely wean of states understanding  Did have covid recovered is having some hair loss discussed biotin  Labs today  Deena Deluca  has a past medical history of Arthritis, Breast cancer (HCC), Hip pain, Hyperlipidemia, Leg pain, and Low back pain.    Allergies   Allergen Reactions   • Morphine Nausea And Vomiting       Current Outpatient Medications:   •  DULoxetine (CYMBALTA) 20 MG capsule, TAKE 1 CAPSULE BY MOUTH EVERY DAY, Disp: 90 capsule, Rfl: 0  •  meloxicam (MOBIC) 7.5 MG tablet, TAKE 1 TABLET BY MOUTH EVERY DAY, Disp: 30 tablet, Rfl: 0  Past Medical History:   Diagnosis Date   • Arthritis    • Breast cancer (HCC)    • Hip pain    • Hyperlipidemia    • Leg pain    • Low back pain      Past Surgical History:   Procedure Laterality Date   • APPENDECTOMY     • BREAST LUMPECTOMY Left     Diagnosed in ; Treatment continued until      Social History     Socioeconomic History   • Marital status:    Tobacco Use   • Smoking status: Former     Packs/day: 1.00     Years: 13.00     Pack years: 13.00     Types: Cigarettes     Start date:      Quit date:      Years since quittin.8   • Smokeless tobacco: Never   Vaping Use   • Vaping Use: Never used   Substance and Sexual Activity   • Alcohol use: Not Currently   • Drug use: Not Currently     Comment: history of methamphetamine- Sober since    • Sexual activity: Not Currently     Family History   Problem Relation Age of Onset   • Hyperthyroidism Mother    • Retinitis pigmentosa Mother    • Arthritis Mother    • No Known Problems Father    • Diabetes type I Son      PHQ-2 Depression Screening  Little interest or pleasure in doing things?     Feeling down, depressed, or hopeless?     PHQ-2 Total Score    "    PHQ-9 Depression Screening  Little interest or pleasure in doing things?     Feeling down, depressed, or hopeless?     Trouble falling or staying asleep, or sleeping too much?     Feeling tired or having little energy?     Poor appetite or overeating?     Feeling bad about yourself - or that you are a failure or have let yourself or your family down?     Trouble concentrating on things, such as reading the newspaper or watching television?     Moving or speaking so slowly that other people could have noticed? Or the opposite - being so fidgety or restless that you have been moving around a lot more than usual?     Thoughts that you would be better off dead, or of hurting yourself in some way?     PHQ-9 Total Score     If you checked off any problems, how difficult have these problems made it for you to do your work, take care of things at home, or get along with other people?         Family history, surgical history, past medical history, Allergies and med's reviewed with patient today and updated in [employer] EMR.     ROS:  Review of Systems   All other systems reviewed and are negative.      OBJECTIVE:  Vitals:    10/27/22 1516   BP: 105/67   BP Location: Right arm   Patient Position: Sitting   Cuff Size: Adult   Pulse: 65   Temp: 98.7 °F (37.1 °C)   TempSrc: Temporal   SpO2: 97%   Weight: 74.6 kg (164 lb 6.4 oz)   Height: 175.3 cm (69\")     Body mass index is 24.28 kg/m².  Physical Exam  Vitals and nursing note reviewed.   Constitutional:       Appearance: Normal appearance. She is well-developed.   HENT:      Head: Normocephalic and atraumatic.   Eyes:      Conjunctiva/sclera: Conjunctivae normal.      Pupils: Pupils are equal, round, and reactive to light.   Cardiovascular:      Rate and Rhythm: Normal rate and regular rhythm.      Heart sounds: Normal heart sounds.   Pulmonary:      Effort: Pulmonary effort is normal.      Breath sounds: Normal breath sounds.   Abdominal:      General: Bowel sounds are normal. "      Palpations: Abdomen is soft.   Musculoskeletal:         General: Normal range of motion.      Cervical back: Normal range of motion and neck supple.   Skin:     General: Skin is warm and dry.   Neurological:      Mental Status: She is alert and oriented to person, place, and time.   Psychiatric:         Behavior: Behavior normal.         Thought Content: Thought content normal.         Judgment: Judgment normal.         ASSESSMENT/ PLAN:    Diagnoses and all orders for this visit:    1. Other iron deficiency anemia (Primary)  -     Iron and TIBC; Future  -     Iron level; Future  -     CBC w AUTO Differential; Future    2. Anxiety        Orders Placed Today:     No orders of the defined types were placed in this encounter.       Management Plan:   If having trouble with wean of cymbalta contact office.  Discussed brain zaps s/e states understanding  An After Visit Summary was printed and given to the patient at discharge.    Follow-up: Return in about 3 months (around 1/27/2023) for Recheck.    YESICA Betancourt 10/30/2022 22:19 EDT  This note was electronically signed.

## 2022-12-07 ENCOUNTER — TELEPHONE (OUTPATIENT)
Dept: FAMILY MEDICINE CLINIC | Facility: CLINIC | Age: 57
End: 2022-12-07

## 2022-12-07 NOTE — TELEPHONE ENCOUNTER
Hub staff attempted to follow warm transfer process and was unsuccessful     Caller: Deena Deluca    Relationship to patient: Self    Best call back number: [phone]    Patient is needing: PATIENT IS NEEDING TO SCHEDULE A LAB APPOINTMENT.     PLEASE CALL TO SCHEDULE PATIENT IN.

## 2022-12-13 ENCOUNTER — TELEPHONE (OUTPATIENT)
Dept: FAMILY MEDICINE CLINIC | Facility: CLINIC | Age: 57
End: 2022-12-13

## 2022-12-13 NOTE — TELEPHONE ENCOUNTER
Hub staff attempted to follow warm transfer process and was unsuccessful     Caller: Deena Deluca    Relationship to patient: Self    Best call back number: [phone]    Patient is needing: TO SCHEDULE LAB APPOINTMENT. PLEASE HAVE AVANI CALL PATIENT TO HANDLE THIS.     PLEASE CALL PATIENT ANYTIME AFTER 2:30PM.

## 2022-12-14 NOTE — TELEPHONE ENCOUNTER
PATIENT RETURNED CALL TO CLINIC.  HUB CANNOT SCHEDULE LAB APPOINTMENTS,  NO ANSWER ON TRANSFER.  PATIENT NEEDS LAB APPOINTMENT BEFORE END OF YEAR.  PLEASE ADVISE

## 2022-12-14 NOTE — TELEPHONE ENCOUNTER
Hub is instructed to read the documentation below to patient     LVM with patient to schedule labs. Patient needs CBC, and iron labs

## 2022-12-15 NOTE — TELEPHONE ENCOUNTER
Caller: Deena Deluca    Relationship: Self    Best call back number: [phone]    PATIENT RETURNED CALL. ATTEMPTED TO WARM TRANSFER X2

## 2022-12-16 NOTE — TELEPHONE ENCOUNTER
Was able to reach pt's . Pt's  relayed message to pt to which pt stated she was not available and would not take the call. I informed the  that she could call back on Monday during normal office hours and we would be happy to place her on the schedule per her request.

## 2022-12-19 ENCOUNTER — TELEPHONE (OUTPATIENT)
Dept: FAMILY MEDICINE CLINIC | Facility: CLINIC | Age: 57
End: 2022-12-19

## 2022-12-19 NOTE — TELEPHONE ENCOUNTER
Pt has been contacted and was advised to go to Urgent Care or ER. Pt declined due to her copay being too high.

## 2022-12-19 NOTE — TELEPHONE ENCOUNTER
Caller: Deena Deluca    Relationship: Self    Best call back number: [phone]    What medication are you requesting:ANTIBIOTIC    What are your current symptoms: SORE THROAT, FATIGUE AND SINUS PRESSURE AND PAIN    How long have you been experiencing symptoms: 3 WEEKS    Have you had these symptoms before:    [x] Yes  [] No    Have you been treated for these symptoms before:   [x] Yes  [] No    If a prescription is needed, what is your preferred pharmacy and phone number: [facility]/PHARMACY #[id number] - [place], [place] - [address]  [phone]  - [phone] FX     Additional notes: SHE COULD NOT GET AN APPOINTMENT TILL 1/3/22 AND REALLY NEEDS TO GET AN ANTIBIOTIC  SO IT DOESN'T GET WORSE.  PLEASE CALL AND ADVISE.

## 2023-01-11 DIAGNOSIS — F41.9 ANXIETY: ICD-10-CM

## 2023-01-11 RX ORDER — DULOXETIN HYDROCHLORIDE 20 MG/1
CAPSULE, DELAYED RELEASE ORAL
Qty: 90 CAPSULE | Refills: 0 | Status: SHIPPED | OUTPATIENT
Start: 2023-01-11

## 2024-03-07 ENCOUNTER — TRANSCRIBE ORDERS (OUTPATIENT)
Dept: ADMINISTRATIVE | Facility: HOSPITAL | Age: 59
End: 2024-03-07
Payer: COMMERCIAL

## 2024-03-07 DIAGNOSIS — Z12.31 SCREENING MAMMOGRAM FOR BREAST CANCER: Primary | ICD-10-CM

## 2024-03-08 ENCOUNTER — TRANSCRIBE ORDERS (OUTPATIENT)
Dept: ADMINISTRATIVE | Facility: HOSPITAL | Age: 59
End: 2024-03-08
Payer: COMMERCIAL

## 2024-03-08 DIAGNOSIS — Z12.31 VISIT FOR SCREENING MAMMOGRAM: Primary | ICD-10-CM

## 2024-03-25 ENCOUNTER — HOSPITAL ENCOUNTER (OUTPATIENT)
Dept: MAMMOGRAPHY | Facility: HOSPITAL | Age: 59
Discharge: HOME | End: 2024-03-25
Payer: COMMERCIAL

## 2024-03-25 ENCOUNTER — HOSPITAL ENCOUNTER (OUTPATIENT)
Dept: ULTRASOUND IMAGING | Facility: HOSPITAL | Age: 59
Discharge: HOME | End: 2024-03-25
Payer: COMMERCIAL

## 2024-03-25 DIAGNOSIS — Z12.31 VISIT FOR SCREENING MAMMOGRAM: ICD-10-CM

## 2024-03-25 PROCEDURE — 76642 ULTRASOUND BREAST LIMITED: CPT

## 2024-03-25 PROCEDURE — 77066 DX MAMMO INCL CAD BI: CPT

## 2024-03-25 PROCEDURE — G0279 TOMOSYNTHESIS, MAMMO: HCPCS
